# Patient Record
Sex: FEMALE | Race: WHITE | Employment: FULL TIME | URBAN - METROPOLITAN AREA
[De-identification: names, ages, dates, MRNs, and addresses within clinical notes are randomized per-mention and may not be internally consistent; named-entity substitution may affect disease eponyms.]

---

## 2017-11-28 ENCOUNTER — INITIAL CONSULT (OUTPATIENT)
Dept: PAIN MANAGEMENT | Age: 37
End: 2017-11-28
Payer: COMMERCIAL

## 2017-11-28 VITALS
HEART RATE: 115 BPM | HEIGHT: 61 IN | OXYGEN SATURATION: 96 % | BODY MASS INDEX: 55.32 KG/M2 | WEIGHT: 293 LBS | RESPIRATION RATE: 16 BRPM

## 2017-11-28 DIAGNOSIS — M25.552 CHRONIC LEFT HIP PAIN: ICD-10-CM

## 2017-11-28 DIAGNOSIS — M54.50 CHRONIC BILATERAL LOW BACK PAIN WITHOUT SCIATICA: ICD-10-CM

## 2017-11-28 DIAGNOSIS — M25.562 CHRONIC PAIN OF LEFT KNEE: Primary | ICD-10-CM

## 2017-11-28 DIAGNOSIS — G89.29 CHRONIC LEFT HIP PAIN: ICD-10-CM

## 2017-11-28 DIAGNOSIS — Z79.891 CHRONIC PRESCRIPTION OPIATE USE: ICD-10-CM

## 2017-11-28 DIAGNOSIS — G57.12 MERALGIA PARESTHETICA OF LEFT SIDE: ICD-10-CM

## 2017-11-28 DIAGNOSIS — G89.29 CHRONIC BILATERAL LOW BACK PAIN WITHOUT SCIATICA: ICD-10-CM

## 2017-11-28 DIAGNOSIS — E66.01 MORBID OBESITY WITH BMI OF 60.0-69.9, ADULT (HCC): ICD-10-CM

## 2017-11-28 DIAGNOSIS — G89.29 CHRONIC PAIN OF LEFT KNEE: Primary | ICD-10-CM

## 2017-11-28 PROCEDURE — 99245 OFF/OP CONSLTJ NEW/EST HI 55: CPT | Performed by: ANESTHESIOLOGY

## 2017-11-28 PROCEDURE — G8427 DOCREV CUR MEDS BY ELIG CLIN: HCPCS | Performed by: ANESTHESIOLOGY

## 2017-11-28 PROCEDURE — G8484 FLU IMMUNIZE NO ADMIN: HCPCS | Performed by: ANESTHESIOLOGY

## 2017-11-28 PROCEDURE — G8417 CALC BMI ABV UP PARAM F/U: HCPCS | Performed by: ANESTHESIOLOGY

## 2017-11-28 RX ORDER — OXYCODONE AND ACETAMINOPHEN 7.5; 325 MG/1; MG/1
1 TABLET ORAL EVERY 4 HOURS PRN
COMMUNITY
End: 2018-02-07 | Stop reason: ALTCHOICE

## 2017-11-28 RX ORDER — HYDROCHLOROTHIAZIDE 12.5 MG/1
CAPSULE, GELATIN COATED ORAL
COMMUNITY
Start: 2017-08-30

## 2017-11-28 RX ORDER — SERTRALINE HYDROCHLORIDE 100 MG/1
150 TABLET, FILM COATED ORAL DAILY
COMMUNITY
Start: 2017-11-23

## 2017-11-28 RX ORDER — FLUTICASONE PROPIONATE 50 MCG
SPRAY, SUSPENSION (ML) NASAL
COMMUNITY
Start: 2017-11-20

## 2017-11-28 RX ORDER — AMITRIPTYLINE HYDROCHLORIDE 10 MG/1
TABLET, FILM COATED ORAL
COMMUNITY
Start: 2017-08-30

## 2017-11-28 ASSESSMENT — ENCOUNTER SYMPTOMS
SINUS PAIN: 1
GASTROINTESTINAL NEGATIVE: 1
BACK PAIN: 1
RESPIRATORY NEGATIVE: 1
EYES NEGATIVE: 1
SINUS PRESSURE: 1

## 2017-11-28 NOTE — LETTER
clear etiology for her chronic pain issue taking very high dose opioid demonstrating extreme sensitivity to touch during physical examination  This presentation suggest opioid dependence from long-term opioid use, opioid tolerance and possible opioid-induced hyperalgesia. - Very high-risk patient for opioid-induced respiratory depression considering morbid obesity    - Numbness and pain over left anterior thigh is likely related to menorrhagia paresthesia caused by morbid obesity.    - Chronic left hip pain radiating into the pelvic area could pursue possibly be related to hip injury and hip arthritis. Ambulatory referral to Physical Therapy   2. Morbid obesity with BMI of 60.0-69.9, adult ([facility] [address]     3. Chronic prescription opiate use     4. Meralgia paresthetica of left side     5. Chronic bilateral low back pain without sciatica  XR Lumbar Spine Ap Lateral Flexion and Extension and Oblique    EMG    Ambulatory referral to Physical Therapy   6. Chronic left hip pain  XR HIP LEFT (2-3 VIEWS)    Ambulatory referral to Physical Therapy           Plan:        - I had a very long and detailed discussion with patient and her accompanying friend that I completely disagree with this use of opioid in this young woman for a nonspecific pain issues without any established diagnosis. I explained to her that in my opinion she developed opioid tolerance and opioid-induced hyperalgesia. I advised her to discontinue opioid and also warned her about the risk of withdrawals for abrupt discontinuation. I discussed with her option of tapering herself off with weekly reduction of 10 mg of oxycodone. I also recommended her for an inpatient Suboxone detox. Patient wants to try doing a taper by herself.   I also explained to her that even for her tapering she will need to discuss this with her primary care physician and I will not be able to write any opioid or controlled substance prescription for her L height see her as an extremely high-risk patient for opioid-induced respiratory depression considering morbid obesity.      - I will order diagnostic workup. Plain x-rays can be completed but for MRI of lumbar spine she will need to complete physical therapy. I explained to her that there are multiple interventional treatment options and other non-opioid modalities that can be used for pain control if she decides to take herself off opioid. This may include injections TENS therapy acupuncture. Controlled Substances Monitoring:     Attestation: The Prescription Monitoring Report for this patient was reviewed today. Ayde Bonilla MD)  Documentation: Possible medication side effects, risk of tolerance and/or dependence, and alternative treatments discussed. Ayde Bonilla MD)    Orders Placed This Encounter   Procedures    XR Lumbar Spine Ap Lateral Flexion and Extension and Oblique     Standing Status:   Future     Standing Expiration Date:   11/28/2018     Order Specific Question:   Reason for exam:     Answer:   chronic low back pain    XR KNEE LEFT (3 VIEWS)     Standing Status:   Future     Standing Expiration Date:   11/28/2018     Order Specific Question:   Reason for exam:     Answer:   left knee pain    XR HIP LEFT (2-3 VIEWS)     Standing Status:   Future     Standing Expiration Date:   12/18/2018     Order Specific Question:   Reason for exam:     Answer:   hip pain    Ambulatory referral to Physical Therapy     Referral Priority:   Routine     Referral Type:   Eval and Treat     Referral Reason:   Continuity of Care     Requested Specialty:   Physical Therapy     Number of Visits Requested:   1    EMG     Standing Status:   Future     Standing Expiration Date:   1/27/2018     Order Specific Question:   Which body part? Answer:   left leg     No orders of the defined types were placed in this encounter.

## 2017-11-28 NOTE — COMMUNICATION BODY
60.0-69.9, adult ([facility] [address]     3. Chronic prescription opiate use     4. Meralgia paresthetica of left side     5. Chronic bilateral low back pain without sciatica  XR Lumbar Spine Ap Lateral Flexion and Extension and Oblique    EMG    Ambulatory referral to Physical Therapy   6. Chronic left hip pain  XR HIP LEFT (2-3 VIEWS)    Ambulatory referral to Physical Therapy           Plan:        - I had a very long and detailed discussion with patient and her accompanying friend that I completely disagree with this use of opioid in this young woman for a nonspecific pain issues without any established diagnosis. I explained to her that in my opinion she developed opioid tolerance and opioid-induced hyperalgesia. I advised her to discontinue opioid and also warned her about the risk of withdrawals for abrupt discontinuation. I discussed with her option of tapering herself off with weekly reduction of 10 mg of oxycodone. I also recommended her for an inpatient Suboxone detox. Patient wants to try doing a taper by herself. I also explained to her that even for her tapering she will need to discuss this with her primary care physician and I will not be able to write any opioid or controlled substance prescription for her L height see her as an extremely high-risk patient for opioid-induced respiratory depression considering morbid obesity.      - I will order diagnostic workup. Plain x-rays can be completed but for MRI of lumbar spine she will need to complete physical therapy. I explained to her that there are multiple interventional treatment options and other non-opioid modalities that can be used for pain control if she decides to take herself off opioid. This may include injections TENS therapy acupuncture. Controlled Substances Monitoring:     Attestation: The Prescription Monitoring Report for this patient was reviewed today.  Maggie Camejo MD)  Documentation: Possible medication side effects, risk of tolerance and/or dependence, and alternative treatments discussed. Mayte Griggs MD)    Orders Placed This Encounter   Procedures    XR Lumbar Spine Ap Lateral Flexion and Extension and Oblique     Standing Status:   Future     Standing Expiration Date:   11/28/2018     Order Specific Question:   Reason for exam:     Answer:   chronic low back pain    XR KNEE LEFT (3 VIEWS)     Standing Status:   Future     Standing Expiration Date:   11/28/2018     Order Specific Question:   Reason for exam:     Answer:   left knee pain    XR HIP LEFT (2-3 VIEWS)     Standing Status:   Future     Standing Expiration Date:   12/18/2018     Order Specific Question:   Reason for exam:     Answer:   hip pain    Ambulatory referral to Physical Therapy     Referral Priority:   Routine     Referral Type:   Eval and Treat     Referral Reason:   Continuity of Care     Requested Specialty:   Physical Therapy     Number of Visits Requested:   1    EMG     Standing Status:   Future     Standing Expiration Date:   1/27/2018     Order Specific Question:   Which body part? Answer:   left leg     No orders of the defined types were placed in this encounter. In today's visit , total time spent face to face with patient was  , in which  50% or more of the time was spent in counseling and coordinating care. The patient was counseled about  1. Diagnostic Impression  2. Recommended Diagnostic Studies  3. Treatment options  4. Risks and benefits of treatment options  5. Importance of compliance with treatment options. 6. Follow up instructions.
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
## 2018-02-05 ENCOUNTER — HOSPITAL ENCOUNTER (OUTPATIENT)
Age: 38
Discharge: HOME | End: 2018-02-07
Payer: COMMERCIAL

## 2018-02-05 ENCOUNTER — HOSPITAL ENCOUNTER (OUTPATIENT)
Dept: GENERAL RADIOLOGY | Age: 38
Discharge: HOME | End: 2018-02-07
Payer: COMMERCIAL

## 2018-02-05 ENCOUNTER — HOSPITAL ENCOUNTER (OUTPATIENT)
Dept: NEUROLOGY | Age: 38
Discharge: HOME | End: 2018-02-05
Payer: COMMERCIAL

## 2018-02-05 DIAGNOSIS — G89.29 CHRONIC BILATERAL LOW BACK PAIN WITHOUT SCIATICA: ICD-10-CM

## 2018-02-05 DIAGNOSIS — M54.50 CHRONIC BILATERAL LOW BACK PAIN WITHOUT SCIATICA: ICD-10-CM

## 2018-02-05 DIAGNOSIS — M25.552 CHRONIC LEFT HIP PAIN: ICD-10-CM

## 2018-02-05 DIAGNOSIS — M25.562 CHRONIC PAIN OF LEFT KNEE: ICD-10-CM

## 2018-02-05 DIAGNOSIS — R52 PAIN: ICD-10-CM

## 2018-02-05 DIAGNOSIS — G89.29 CHRONIC LEFT HIP PAIN: ICD-10-CM

## 2018-02-05 DIAGNOSIS — G89.29 CHRONIC PAIN OF LEFT KNEE: ICD-10-CM

## 2018-02-05 PROCEDURE — 95909 NRV CNDJ TST 5-6 STUDIES: CPT | Performed by: PHYSICAL MEDICINE & REHABILITATION

## 2018-02-05 PROCEDURE — 72114 X-RAY EXAM L-S SPINE BENDING: CPT

## 2018-02-05 PROCEDURE — 73502 X-RAY EXAM HIP UNI 2-3 VIEWS: CPT

## 2018-02-05 PROCEDURE — 95886 MUSC TEST DONE W/N TEST COMP: CPT | Performed by: PHYSICAL MEDICINE & REHABILITATION

## 2018-02-06 ENCOUNTER — HOSPITAL ENCOUNTER (OUTPATIENT)
Dept: GENERAL RADIOLOGY | Age: 38
Discharge: HOME | End: 2018-02-08
Payer: COMMERCIAL

## 2018-02-06 PROCEDURE — 73562 X-RAY EXAM OF KNEE 3: CPT

## 2018-02-07 ENCOUNTER — OFFICE VISIT (OUTPATIENT)
Dept: PAIN MANAGEMENT | Age: 38
End: 2018-02-07
Payer: COMMERCIAL

## 2018-02-07 VITALS
HEART RATE: 103 BPM | SYSTOLIC BLOOD PRESSURE: 131 MMHG | RESPIRATION RATE: 16 BRPM | OXYGEN SATURATION: 97 % | BODY MASS INDEX: 55.32 KG/M2 | DIASTOLIC BLOOD PRESSURE: 89 MMHG | HEIGHT: 61 IN | WEIGHT: 293 LBS

## 2018-02-07 DIAGNOSIS — Z79.891 CHRONIC PRESCRIPTION OPIATE USE: ICD-10-CM

## 2018-02-07 DIAGNOSIS — E66.01 MORBID OBESITY WITH BMI OF 60.0-69.9, ADULT (HCC): ICD-10-CM

## 2018-02-07 DIAGNOSIS — M54.50 CHRONIC BILATERAL LOW BACK PAIN WITHOUT SCIATICA: Primary | ICD-10-CM

## 2018-02-07 DIAGNOSIS — G89.29 CHRONIC BILATERAL LOW BACK PAIN WITHOUT SCIATICA: Primary | ICD-10-CM

## 2018-02-07 PROCEDURE — 99214 OFFICE O/P EST MOD 30 MIN: CPT | Performed by: ANESTHESIOLOGY

## 2018-02-07 PROCEDURE — G8417 CALC BMI ABV UP PARAM F/U: HCPCS | Performed by: ANESTHESIOLOGY

## 2018-02-07 PROCEDURE — G8427 DOCREV CUR MEDS BY ELIG CLIN: HCPCS | Performed by: ANESTHESIOLOGY

## 2018-02-07 PROCEDURE — 1036F TOBACCO NON-USER: CPT | Performed by: ANESTHESIOLOGY

## 2018-02-07 PROCEDURE — G8484 FLU IMMUNIZE NO ADMIN: HCPCS | Performed by: ANESTHESIOLOGY

## 2018-02-07 RX ORDER — GABAPENTIN 400 MG/1
400 CAPSULE ORAL 2 TIMES DAILY
Qty: 60 CAPSULE | Refills: 2 | Status: SHIPPED | OUTPATIENT
Start: 2018-02-07 | End: 2018-04-18 | Stop reason: ALTCHOICE

## 2018-02-07 RX ORDER — GABAPENTIN 100 MG/1
CAPSULE ORAL
COMMUNITY
Start: 2017-08-14 | End: 2018-04-04 | Stop reason: ALTCHOICE

## 2018-02-07 ASSESSMENT — ENCOUNTER SYMPTOMS: BACK PAIN: 1
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
## 2018-03-12 ENCOUNTER — TELEPHONE (OUTPATIENT)
Dept: PAIN MANAGEMENT | Age: 38
End: 2018-03-12

## 2018-03-16 NOTE — TELEPHONE ENCOUNTER
Spoke to patient she will discuss with the pharmacist and her family doc. She moved her appt up to April just in case Sxs do not stop.

## 2018-04-04 ENCOUNTER — OFFICE VISIT (OUTPATIENT)
Dept: PAIN MANAGEMENT | Age: 38
End: 2018-04-04
Payer: COMMERCIAL

## 2018-04-04 VITALS
BODY MASS INDEX: 53.92 KG/M2 | SYSTOLIC BLOOD PRESSURE: 147 MMHG | OXYGEN SATURATION: 99 % | WEIGHT: 293 LBS | DIASTOLIC BLOOD PRESSURE: 97 MMHG | RESPIRATION RATE: 16 BRPM | HEIGHT: 62 IN | HEART RATE: 98 BPM

## 2018-04-04 DIAGNOSIS — E66.01 MORBID OBESITY WITH BMI OF 60.0-69.9, ADULT (HCC): ICD-10-CM

## 2018-04-04 DIAGNOSIS — G89.29 CHRONIC BILATERAL LOW BACK PAIN WITHOUT SCIATICA: ICD-10-CM

## 2018-04-04 DIAGNOSIS — M54.16 CHRONIC LUMBAR RADICULOPATHY: Primary | ICD-10-CM

## 2018-04-04 DIAGNOSIS — M54.50 CHRONIC BILATERAL LOW BACK PAIN WITHOUT SCIATICA: ICD-10-CM

## 2018-04-04 PROCEDURE — 99213 OFFICE O/P EST LOW 20 MIN: CPT | Performed by: ANESTHESIOLOGY

## 2018-04-04 PROCEDURE — G8427 DOCREV CUR MEDS BY ELIG CLIN: HCPCS | Performed by: ANESTHESIOLOGY

## 2018-04-04 PROCEDURE — G8417 CALC BMI ABV UP PARAM F/U: HCPCS | Performed by: ANESTHESIOLOGY

## 2018-04-04 PROCEDURE — 1036F TOBACCO NON-USER: CPT | Performed by: ANESTHESIOLOGY

## 2018-04-04 RX ORDER — PREGABALIN 75 MG/1
75 CAPSULE ORAL 2 TIMES DAILY
Qty: 28 CAPSULE | Refills: 0 | Status: SHIPPED | OUTPATIENT
Start: 2018-04-04 | End: 2018-04-18 | Stop reason: SDUPTHER

## 2018-04-04 ASSESSMENT — ENCOUNTER SYMPTOMS: BACK PAIN: 1

## 2018-04-18 ENCOUNTER — OFFICE VISIT (OUTPATIENT)
Dept: PAIN MANAGEMENT | Age: 38
End: 2018-04-18
Payer: COMMERCIAL

## 2018-04-18 VITALS
HEIGHT: 61 IN | WEIGHT: 293 LBS | OXYGEN SATURATION: 97 % | BODY MASS INDEX: 55.32 KG/M2 | TEMPERATURE: 98.4 F | SYSTOLIC BLOOD PRESSURE: 123 MMHG | DIASTOLIC BLOOD PRESSURE: 80 MMHG | RESPIRATION RATE: 16 BRPM | HEART RATE: 105 BPM

## 2018-04-18 DIAGNOSIS — G89.29 CHRONIC BILATERAL LOW BACK PAIN WITHOUT SCIATICA: ICD-10-CM

## 2018-04-18 DIAGNOSIS — M54.50 CHRONIC BILATERAL LOW BACK PAIN WITHOUT SCIATICA: ICD-10-CM

## 2018-04-18 DIAGNOSIS — M79.7 FIBROMYALGIA: Primary | ICD-10-CM

## 2018-04-18 DIAGNOSIS — M54.16 CHRONIC LUMBAR RADICULOPATHY: ICD-10-CM

## 2018-04-18 DIAGNOSIS — E66.01 MORBID OBESITY WITH BMI OF 60.0-69.9, ADULT (HCC): ICD-10-CM

## 2018-04-18 PROCEDURE — G8417 CALC BMI ABV UP PARAM F/U: HCPCS | Performed by: ANESTHESIOLOGY

## 2018-04-18 PROCEDURE — 1036F TOBACCO NON-USER: CPT | Performed by: ANESTHESIOLOGY

## 2018-04-18 PROCEDURE — G8427 DOCREV CUR MEDS BY ELIG CLIN: HCPCS | Performed by: ANESTHESIOLOGY

## 2018-04-18 PROCEDURE — 99214 OFFICE O/P EST MOD 30 MIN: CPT | Performed by: ANESTHESIOLOGY

## 2018-04-18 RX ORDER — PREGABALIN 75 MG/1
75 CAPSULE ORAL 2 TIMES DAILY
Qty: 60 CAPSULE | Refills: 1 | Status: SHIPPED | OUTPATIENT
Start: 2018-04-18 | End: 2018-06-29 | Stop reason: SDUPTHER

## 2018-04-18 ASSESSMENT — ENCOUNTER SYMPTOMS
RESPIRATORY NEGATIVE: 1
GASTROINTESTINAL NEGATIVE: 1

## 2018-06-29 DIAGNOSIS — M54.16 CHRONIC LUMBAR RADICULOPATHY: ICD-10-CM

## 2018-06-29 DIAGNOSIS — G89.29 CHRONIC BILATERAL LOW BACK PAIN WITHOUT SCIATICA: ICD-10-CM

## 2018-06-29 DIAGNOSIS — M54.50 CHRONIC BILATERAL LOW BACK PAIN WITHOUT SCIATICA: ICD-10-CM

## 2018-06-29 DIAGNOSIS — M79.7 FIBROMYALGIA: ICD-10-CM

## 2018-06-29 RX ORDER — PREGABALIN 75 MG/1
75 CAPSULE ORAL 2 TIMES DAILY
Qty: 60 CAPSULE | Refills: 1 | Status: SHIPPED | OUTPATIENT
Start: 2018-06-29 | End: 2018-07-29

## 2018-07-17 ENCOUNTER — OFFICE VISIT (OUTPATIENT)
Dept: PAIN MANAGEMENT | Age: 38
End: 2018-07-17
Payer: COMMERCIAL

## 2018-07-17 VITALS
BODY MASS INDEX: 55.32 KG/M2 | DIASTOLIC BLOOD PRESSURE: 82 MMHG | RESPIRATION RATE: 16 BRPM | WEIGHT: 293 LBS | SYSTOLIC BLOOD PRESSURE: 138 MMHG | HEIGHT: 61 IN | OXYGEN SATURATION: 96 % | HEART RATE: 101 BPM

## 2018-07-17 DIAGNOSIS — G89.21 CHRONIC PAIN DUE TO TRAUMA: ICD-10-CM

## 2018-07-17 DIAGNOSIS — M54.16 CHRONIC LUMBAR RADICULOPATHY: Primary | ICD-10-CM

## 2018-07-17 PROBLEM — K59.1 FUNCTIONAL DIARRHEA: Status: ACTIVE | Noted: 2018-03-20

## 2018-07-17 PROBLEM — Z01.419 ENCOUNTER FOR WELL WOMAN EXAM: Status: ACTIVE | Noted: 2017-06-07

## 2018-07-17 PROBLEM — F90.2 ADHD (ATTENTION DEFICIT HYPERACTIVITY DISORDER), COMBINED TYPE: Status: ACTIVE | Noted: 2017-06-06

## 2018-07-17 PROBLEM — F51.01 PRIMARY INSOMNIA: Status: ACTIVE | Noted: 2017-06-09

## 2018-07-17 PROCEDURE — 99214 OFFICE O/P EST MOD 30 MIN: CPT | Performed by: NURSE PRACTITIONER

## 2018-07-17 RX ORDER — GABAPENTIN 400 MG/1
400 CAPSULE ORAL 2 TIMES DAILY
Qty: 60 CAPSULE | Refills: 2 | Status: SHIPPED | OUTPATIENT
Start: 2018-07-17 | End: 2018-09-11 | Stop reason: SDUPTHER

## 2018-07-17 RX ORDER — FLUTICASONE PROPIONATE 50 MCG
SPRAY, SUSPENSION (ML) NASAL
COMMUNITY
Start: 2018-06-12

## 2018-07-17 ASSESSMENT — ENCOUNTER SYMPTOMS
NAUSEA: 0
CONSTIPATION: 0
VOMITING: 0
DIARRHEA: 1
BACK PAIN: 1
ABDOMINAL PAIN: 0
SHORTNESS OF BREATH: 0
COUGH: 0
BLOATING: 0
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
## 2018-08-16 PROBLEM — Z01.419 ENCOUNTER FOR WELL WOMAN EXAM: Status: RESOLVED | Noted: 2017-06-07 | Resolved: 2018-08-16

## 2018-09-11 ENCOUNTER — OFFICE VISIT (OUTPATIENT)
Dept: PAIN MANAGEMENT | Age: 38
End: 2018-09-11
Payer: COMMERCIAL

## 2018-09-11 VITALS
DIASTOLIC BLOOD PRESSURE: 93 MMHG | HEIGHT: 61 IN | WEIGHT: 293 LBS | BODY MASS INDEX: 55.32 KG/M2 | HEART RATE: 93 BPM | RESPIRATION RATE: 16 BRPM | SYSTOLIC BLOOD PRESSURE: 150 MMHG | OXYGEN SATURATION: 97 %

## 2018-09-11 DIAGNOSIS — M54.16 CHRONIC LUMBAR RADICULOPATHY: Primary | ICD-10-CM

## 2018-09-11 PROCEDURE — 99213 OFFICE O/P EST LOW 20 MIN: CPT | Performed by: NURSE PRACTITIONER

## 2018-09-11 RX ORDER — GABAPENTIN 100 MG/1
200 CAPSULE ORAL 2 TIMES DAILY
Qty: 120 CAPSULE | Refills: 2 | Status: SHIPPED | OUTPATIENT
Start: 2018-09-11 | End: 2018-10-11

## 2018-09-11 ASSESSMENT — ENCOUNTER SYMPTOMS
RESPIRATORY NEGATIVE: 1
BACK PAIN: 1
DIARRHEA: 1

## 2018-09-11 NOTE — PROGRESS NOTES
obtaining medications from different sources and denies use of illegal drugs. Patient denies side effects from medications like nausea, vomiting, constipation or drowsiness. Patient reports current activities of daily living are possible due to medications and would like to continue them. As always, we encourage daily stretching and strengthening exercises, and recommend minimizing use of pain medications unless patient cannot get through daily activities due to pain. Continue opioid therapy. Script written for gabapentin, changed dose to 200mg BID. Pt to monitor pain relief and effectiveness  Consider TID dosing if symptoms not relieved  Pt has appt next month with GI to evaluate continued diarrhea. Suggested pt keep a food diary to track symptoms. She no longer feels it is caused by medication  Pt encouraged to start OTC probiotics  Follow up appointment made for 4 weeks    Controlled Substances Monitoring:     RX Monitoring 9/11/2018   Attestation The Prescription Monitoring Report for this patient was reviewed today. Documentation Possible medication side effects, risk of tolerance/dependence & alternative treatments discussed. ;Obtaining appropriate analgesic effect of treatment. ;No signs of potential drug abuse or diversion identified.    Medication Contracts -           Zeny Capps MA